# Patient Record
Sex: MALE | Race: ASIAN | NOT HISPANIC OR LATINO
[De-identification: names, ages, dates, MRNs, and addresses within clinical notes are randomized per-mention and may not be internally consistent; named-entity substitution may affect disease eponyms.]

---

## 2018-11-09 ENCOUNTER — APPOINTMENT (OUTPATIENT)
Dept: ULTRASOUND IMAGING | Facility: CLINIC | Age: 48
End: 2018-11-09
Payer: MEDICAID

## 2018-11-09 ENCOUNTER — OUTPATIENT (OUTPATIENT)
Dept: OUTPATIENT SERVICES | Facility: HOSPITAL | Age: 48
LOS: 1 days | End: 2018-11-09
Payer: COMMERCIAL

## 2018-11-09 DIAGNOSIS — Z00.8 ENCOUNTER FOR OTHER GENERAL EXAMINATION: ICD-10-CM

## 2018-11-09 PROCEDURE — 76700 US EXAM ABDOM COMPLETE: CPT | Mod: 26

## 2018-11-09 PROCEDURE — 76700 US EXAM ABDOM COMPLETE: CPT

## 2019-10-04 ENCOUNTER — OUTPATIENT (OUTPATIENT)
Dept: OUTPATIENT SERVICES | Facility: HOSPITAL | Age: 49
LOS: 1 days | End: 2019-10-04
Payer: COMMERCIAL

## 2019-10-04 ENCOUNTER — APPOINTMENT (OUTPATIENT)
Dept: ULTRASOUND IMAGING | Facility: HOSPITAL | Age: 49
End: 2019-10-04
Payer: COMMERCIAL

## 2019-10-04 DIAGNOSIS — R10.11 RIGHT UPPER QUADRANT PAIN: ICD-10-CM

## 2019-10-04 DIAGNOSIS — K82.4 CHOLESTEROLOSIS OF GALLBLADDER: ICD-10-CM

## 2019-10-04 PROCEDURE — 76705 ECHO EXAM OF ABDOMEN: CPT

## 2019-10-04 PROCEDURE — 76705 ECHO EXAM OF ABDOMEN: CPT | Mod: 26

## 2021-10-27 ENCOUNTER — APPOINTMENT (OUTPATIENT)
Dept: UROLOGY | Facility: CLINIC | Age: 51
End: 2021-10-27
Payer: COMMERCIAL

## 2021-10-27 VITALS — HEART RATE: 66 BPM | DIASTOLIC BLOOD PRESSURE: 70 MMHG | TEMPERATURE: 97.8 F | SYSTOLIC BLOOD PRESSURE: 110 MMHG

## 2021-10-27 PROCEDURE — 99204 OFFICE O/P NEW MOD 45 MIN: CPT

## 2021-10-27 PROCEDURE — 51798 US URINE CAPACITY MEASURE: CPT

## 2021-11-15 ENCOUNTER — OUTPATIENT (OUTPATIENT)
Dept: OUTPATIENT SERVICES | Facility: HOSPITAL | Age: 51
LOS: 1 days | End: 2021-11-15
Payer: COMMERCIAL

## 2021-11-15 ENCOUNTER — APPOINTMENT (OUTPATIENT)
Dept: ULTRASOUND IMAGING | Facility: CLINIC | Age: 51
End: 2021-11-15

## 2021-11-15 DIAGNOSIS — Z00.8 ENCOUNTER FOR OTHER GENERAL EXAMINATION: ICD-10-CM

## 2021-11-15 PROCEDURE — 76700 US EXAM ABDOM COMPLETE: CPT | Mod: 26

## 2021-11-15 PROCEDURE — 76700 US EXAM ABDOM COMPLETE: CPT

## 2021-12-29 LAB
APPEARANCE: CLEAR
BACTERIA UR CULT: NORMAL
BACTERIA: NEGATIVE
BILIRUBIN URINE: NEGATIVE
BLOOD URINE: NEGATIVE
COLOR: NORMAL
GLUCOSE QUALITATIVE U: NEGATIVE
HYALINE CASTS: 0 /LPF
KETONES URINE: NEGATIVE
LEUKOCYTE ESTERASE URINE: NEGATIVE
MICROSCOPIC-UA: NORMAL
NITRITE URINE: NEGATIVE
PH URINE: 6
PROTEIN URINE: NEGATIVE
RED BLOOD CELLS URINE: 0 /HPF
SPECIFIC GRAVITY URINE: 1.03
SQUAMOUS EPITHELIAL CELLS: 0 /HPF
UROBILINOGEN URINE: NORMAL
WHITE BLOOD CELLS URINE: 0 /HPF

## 2022-05-06 ENCOUNTER — NON-APPOINTMENT (OUTPATIENT)
Age: 52
End: 2022-05-06

## 2022-05-06 ENCOUNTER — APPOINTMENT (OUTPATIENT)
Dept: FAMILY MEDICINE | Facility: CLINIC | Age: 52
End: 2022-05-06
Payer: COMMERCIAL

## 2022-05-06 VITALS
TEMPERATURE: 98 F | RESPIRATION RATE: 12 BRPM | OXYGEN SATURATION: 98 % | DIASTOLIC BLOOD PRESSURE: 70 MMHG | HEART RATE: 70 BPM | SYSTOLIC BLOOD PRESSURE: 108 MMHG

## 2022-05-06 DIAGNOSIS — R19.8 OTHER SPECIFIED SYMPTOMS AND SIGNS INVOLVING THE DIGESTIVE SYSTEM AND ABDOMEN: ICD-10-CM

## 2022-05-06 PROCEDURE — 99204 OFFICE O/P NEW MOD 45 MIN: CPT

## 2022-05-06 RX ORDER — ZOSTER VACCINE RECOMBINANT, ADJUVANTED 50 MCG/0.5
50 KIT INTRAMUSCULAR
Qty: 1 | Refills: 1 | Status: ACTIVE | COMMUNITY
Start: 2022-05-06 | End: 1900-01-01

## 2022-05-06 NOTE — HISTORY OF PRESENT ILLNESS
[de-identified] : Presents to establish self to practice. Reports he is healthy but suffers from urinary frequency chronically. Has seen in urologist in October who started patient on Flomax with mild relief of symptoms. He reports he believes he is not retaining urine and it generally worse after he drinks coffee. \par Reports last lipid panel was indicative of mild elevation in total cholesterol. Overall he has cleaned up his diet.\par \par Family hx of colon cancer. Colonoscopy about 4 years ago which was unremarkable per patient

## 2022-05-06 NOTE — HEALTH RISK ASSESSMENT
[Very Good] : ~his/her~  mood as very good [Never] : Never [Yes] : Yes [Monthly or less (1 pt)] : Monthly or less (1 point) [1 or 2 (0 pts)] : 1 or 2 (0 points) [Never (0 pts)] : Never (0 points) [No] : In the past 12 months have you used drugs other than those required for medical reasons? No [0] : 2) Feeling down, depressed, or hopeless: Not at all (0) [PHQ-2 Negative - No further assessment needed] : PHQ-2 Negative - No further assessment needed [Patient reported colonoscopy was normal] : Patient reported colonoscopy was normal [Alone] : lives alone [With Family] : lives with family [Employed] : employed [College] : College [] :  [# Of Children ___] : has [unfilled] children [Sexually Active] : sexually active [Fully functional (using the telephone, shopping, preparing meals, housekeeping, doing laundry, using] : Fully functional and needs no help or supervision to perform IADLs (using the telephone, shopping, preparing meals, housekeeping, doing laundry, using transportation, managing medications and managing finances) [FreeTextEntry1] : Urinary frequency  [Change in mental status noted] : No change in mental status noted [Reports changes in hearing] : Reports no changes in hearing [Reports changes in vision] : Reports no changes in vision [Reports changes in dental health] : Reports no changes in dental health [ColonoscopyDate] : 2018

## 2022-05-06 NOTE — ASSESSMENT
[FreeTextEntry1] : Encouraged eval with GI and urology \par UA/UC\par Decrease caffeine, try to double void, last drink before 6 -7 pm \par COmprehensive blood work sent \par ENcouraged fu for physical \par Shingles vaccine sent

## 2022-05-16 LAB
ALBUMIN SERPL ELPH-MCNC: 4.7 G/DL
ALP BLD-CCNC: 72 U/L
ALT SERPL-CCNC: 40 U/L
ANION GAP SERPL CALC-SCNC: 12 MMOL/L
APPEARANCE: CLEAR
AST SERPL-CCNC: 29 U/L
BACTERIA: NEGATIVE
BASOPHILS # BLD AUTO: 0.04 K/UL
BASOPHILS NFR BLD AUTO: 0.6 %
BILIRUB SERPL-MCNC: 0.4 MG/DL
BILIRUBIN URINE: NEGATIVE
BLOOD URINE: NEGATIVE
BUN SERPL-MCNC: 14 MG/DL
CALCIUM SERPL-MCNC: 9.3 MG/DL
CHLORIDE SERPL-SCNC: 104 MMOL/L
CHOLEST SERPL-MCNC: 241 MG/DL
CO2 SERPL-SCNC: 25 MMOL/L
COLOR: NORMAL
CREAT SERPL-MCNC: 0.66 MG/DL
EGFR: 114 ML/MIN/1.73M2
EOSINOPHIL # BLD AUTO: 0.17 K/UL
EOSINOPHIL NFR BLD AUTO: 2.7 %
ESTIMATED AVERAGE GLUCOSE: 120 MG/DL
GLUCOSE QUALITATIVE U: NEGATIVE
GLUCOSE SERPL-MCNC: 106 MG/DL
HBA1C MFR BLD HPLC: 5.8 %
HCT VFR BLD CALC: 42.9 %
HDLC SERPL-MCNC: 74 MG/DL
HGB BLD-MCNC: 14.2 G/DL
HYALINE CASTS: 0 /LPF
IMM GRANULOCYTES NFR BLD AUTO: 0.5 %
KETONES URINE: NEGATIVE
LDLC SERPL CALC-MCNC: 155 MG/DL
LEUKOCYTE ESTERASE URINE: NEGATIVE
LYMPHOCYTES # BLD AUTO: 2.32 K/UL
LYMPHOCYTES NFR BLD AUTO: 36.7 %
MAN DIFF?: NORMAL
MCHC RBC-ENTMCNC: 31.8 PG
MCHC RBC-ENTMCNC: 33.1 GM/DL
MCV RBC AUTO: 96 FL
MICROSCOPIC-UA: NORMAL
MONOCYTES # BLD AUTO: 0.46 K/UL
MONOCYTES NFR BLD AUTO: 7.3 %
NEUTROPHILS # BLD AUTO: 3.31 K/UL
NEUTROPHILS NFR BLD AUTO: 52.2 %
NITRITE URINE: NEGATIVE
NONHDLC SERPL-MCNC: 167 MG/DL
OSMOLALITY SERPL: 298 MOSMOL/KG
OSMOLALITY UR: 807 MOSM/KG
PH URINE: 6.5
PLATELET # BLD AUTO: 254 K/UL
POTASSIUM SERPL-SCNC: 4.7 MMOL/L
PROT SERPL-MCNC: 7.4 G/DL
PROTEIN URINE: NEGATIVE
PSA FREE FLD-MCNC: 34 %
PSA FREE SERPL-MCNC: 0.23 NG/ML
PSA SERPL-MCNC: 0.68 NG/ML
RBC # BLD: 4.47 M/UL
RBC # FLD: 12.3 %
RED BLOOD CELLS URINE: 3 /HPF
SODIUM SERPL-SCNC: 141 MMOL/L
SPECIFIC GRAVITY URINE: 1.02
SQUAMOUS EPITHELIAL CELLS: 0 /HPF
TRIGL SERPL-MCNC: 61 MG/DL
TSH SERPL-ACNC: 1.7 UIU/ML
UROBILINOGEN URINE: NORMAL
WBC # FLD AUTO: 6.33 K/UL
WHITE BLOOD CELLS URINE: 1 /HPF

## 2022-06-28 ENCOUNTER — APPOINTMENT (OUTPATIENT)
Dept: UROLOGY | Facility: CLINIC | Age: 52
End: 2022-06-28

## 2022-06-28 DIAGNOSIS — Z80.0 FAMILY HISTORY OF MALIGNANT NEOPLASM OF DIGESTIVE ORGANS: ICD-10-CM

## 2022-06-28 DIAGNOSIS — Z78.9 OTHER SPECIFIED HEALTH STATUS: ICD-10-CM

## 2022-06-28 DIAGNOSIS — Z80.1 FAMILY HISTORY OF MALIGNANT NEOPLASM OF TRACHEA, BRONCHUS AND LUNG: ICD-10-CM

## 2022-06-28 PROCEDURE — 99213 OFFICE O/P EST LOW 20 MIN: CPT

## 2022-06-28 NOTE — HISTORY OF PRESENT ILLNESS
[FreeTextEntry1] : : Sep 19 1970 \par Referring Provider: FAUSTINO SIMON,MEGA RANDALL \par \par HPI: Mr. ASHLY SONG is a 51 year yo M with a PMHx notable for urinary frequency. He states that he has urinary frequency also when he is running. He previously saw Dr. Cruz in Oct 2021. He previously was drinking a lot of coffee, he stopped about a week ago and he noticed some decrease in the frequency of urination. He normally can hold his urine for 1-2 hours..  He had a PSA done in May which was very low at 0.68.  His stream is not bad, but he goes a lot during the day and night.  Residual urine is low ~30 cc. He did try tamsulosin which has helped with stream and not much improvement with the frequency. Nocturia 1-2x a night. \par \par Anticoagulation: None\par All: NKDA\par Social: nonsmoker, social EtOH,  with children, very active 7-8 miles a day runner\par PMHx: borderline HLD\par FHx: father with colon cancer and mother with lung cancer\par PSHx: lipoma removal \par Labs: PSA 0.68 ng/mL [Urinary Incontinence] : no urinary incontinence [Urinary Retention] : no urinary retention [Urinary Urgency] : urinary urgency [Urinary Frequency] : urinary frequency

## 2022-07-20 ENCOUNTER — RX CHANGE (OUTPATIENT)
Age: 52
End: 2022-07-20

## 2022-07-28 ENCOUNTER — APPOINTMENT (OUTPATIENT)
Dept: UROLOGY | Facility: CLINIC | Age: 52
End: 2022-07-28

## 2022-07-28 VITALS
DIASTOLIC BLOOD PRESSURE: 72 MMHG | RESPIRATION RATE: 18 BRPM | SYSTOLIC BLOOD PRESSURE: 116 MMHG | HEIGHT: 71 IN | HEART RATE: 74 BPM | BODY MASS INDEX: 23.8 KG/M2 | WEIGHT: 170 LBS | TEMPERATURE: 98.2 F

## 2022-07-28 PROCEDURE — 99212 OFFICE O/P EST SF 10 MIN: CPT

## 2022-07-28 NOTE — HISTORY OF PRESENT ILLNESS
[FreeTextEntry1] : : Sep 19 1970 \par Referring Provider: FAUSTINO SIMON,MEGA RANDALL \par \par HPI: Mr. ASHLY SONG is a 51 year yo M with a PMHx notable for urinary frequency. He states that he has urinary frequency also when he is running. He previously saw Dr. Cruz in Oct 2021. He previously was drinking a lot of coffee, he stopped about a week ago and he noticed some decrease in the frequency of urination. He normally can hold his urine for 1-2 hours..  He had a PSA done in May which was very low at 0.68.  His stream is not bad, but he goes a lot during the day and night.  Residual urine is low ~30 cc. He did try tamsulosin which has helped with stream and not much improvement with the frequency. Nocturia 1-2x a night. \par \par Anticoagulation: None\par All: NKDA\par Social: nonsmoker, social EtOH,  with children, very active 7-8 miles a day runner\par PMHx: borderline HLD\par FHx: father with colon cancer and mother with lung cancer\par PSHx: lipoma removal \par Labs: PSA 0.68 ng/mL\par \par :\par Here today for follow up for urinary frequency. He feels that this has improved his symptoms about 50%, mild constipation but no dry mouth. He's currently on oxybutynin 5 mg XL. His urinary frequency is down to 2-3 hours feels improved. Wants to try vesicare and interested in trying pelvic floor PT. [Urinary Incontinence] : no urinary incontinence [Urinary Retention] : no urinary retention [Urinary Urgency] : urinary urgency [Urinary Frequency] : urinary frequency

## 2022-09-08 ENCOUNTER — APPOINTMENT (OUTPATIENT)
Dept: UROLOGY | Facility: CLINIC | Age: 52
End: 2022-09-08

## 2023-01-05 ENCOUNTER — APPOINTMENT (OUTPATIENT)
Dept: UROLOGY | Facility: CLINIC | Age: 53
End: 2023-01-05
Payer: COMMERCIAL

## 2023-01-05 VITALS — RESPIRATION RATE: 17 BRPM | SYSTOLIC BLOOD PRESSURE: 115 MMHG | DIASTOLIC BLOOD PRESSURE: 75 MMHG | HEART RATE: 77 BPM

## 2023-01-05 PROCEDURE — 99213 OFFICE O/P EST LOW 20 MIN: CPT

## 2023-01-05 NOTE — ASSESSMENT
[FreeTextEntry1] : 51 yo with urinary frequency, improved on solifenacin except for when working out

## 2023-01-05 NOTE — HISTORY OF PRESENT ILLNESS
[FreeTextEntry1] : : Sep 19 1970 \par Referring Provider: FAUSTINO SIMON,MEGA RANDALL \par \par HPI: Mr. ASHLY SONG is a 51 year yo M with a PMHx notable for urinary frequency. He states that he has urinary frequency also when he is running. He previously saw Dr. Cruz in Oct 2021. He previously was drinking a lot of coffee, he stopped about a week ago and he noticed some decrease in the frequency of urination. He normally can hold his urine for 1-2 hours..  He had a PSA done in May which was very low at 0.68.  His stream is not bad, but he goes a lot during the day and night.  Residual urine is low ~30 cc. He did try tamsulosin which has helped with stream and not much improvement with the frequency. Nocturia 1-2x a night. \par \par Anticoagulation: None\par All: NKDA\par Social: nonsmoker, social EtOH,  with children, very active 7-8 miles a day runner\par PMHx: borderline HLD\par FHx: father with colon cancer and mother with lung cancer\par PSHx: lipoma removal \par Labs: PSA 0.68 ng/mL\par \par :\par Here today for follow up for urinary frequency. He feels that this has improved his symptoms about 50%, mild constipation but no dry mouth. He's currently on oxybutynin 5 mg XL. His urinary frequency is down to 2-3 hours feels improved. Wants to try vesicare and interested in trying pelvic floor PT.\par \par :\par Here today for follow up for urinary frequency. Has been on the vesicare 10 mg PO with improvement of his symptoms, no constipation; mild dry mouth occasionally. Urinary frequency every 3-4 hours. Mostly complaining of urinary frequency when he is running. Has not had pelvic floor PT. Possible related to pelvic floor contraction when running resulting in overactivity- 3x during a 1 hour run. [Urinary Incontinence] : no urinary incontinence [Urinary Retention] : no urinary retention [Urinary Urgency] : urinary urgency [Urinary Frequency] : urinary frequency

## 2023-04-20 ENCOUNTER — APPOINTMENT (OUTPATIENT)
Dept: UROLOGY | Facility: CLINIC | Age: 53
End: 2023-04-20
Payer: COMMERCIAL

## 2023-04-20 PROCEDURE — 99213 OFFICE O/P EST LOW 20 MIN: CPT

## 2023-04-22 LAB — PSA SERPL-MCNC: 0.66 NG/ML

## 2023-04-22 NOTE — PHYSICAL EXAM
[General Appearance - Well Developed] : well developed [General Appearance - Well Nourished] : well nourished [Bowel Sounds] : normal bowel sounds [Abdomen Soft] : soft [Skin Color & Pigmentation] : normal skin color and pigmentation [Heart Rate And Rhythm] : Heart rate and rhythm were normal [Skin Turgor] : supple [] : no respiratory distress [Oriented To Time, Place, And Person] : oriented to person, place, and time [Respiration, Rhythm And Depth] : normal respiratory rhythm and effort [Not Anxious] : not anxious [Normal Station and Gait] : the gait and station were normal for the patient's age [No Focal Deficits] : no focal deficits

## 2023-04-22 NOTE — HISTORY OF PRESENT ILLNESS
[FreeTextEntry1] : : Sep 19 1970 \par Referring Provider: FAUSTINO SIMON,MEGA RANDALL \par \par HPI: Mr. ASHLY SONG is a 51 year yo M with a PMHx notable for urinary frequency. He states that he has urinary frequency also when he is running. He previously saw Dr. Cruz in Oct 2021. He previously was drinking a lot of coffee, he stopped about a week ago and he noticed some decrease in the frequency of urination. He normally can hold his urine for 1-2 hours..  He had a PSA done in May which was very low at 0.68.  His stream is not bad, but he goes a lot during the day and night.  Residual urine is low ~30 cc. He did try tamsulosin which has helped with stream and not much improvement with the frequency. Nocturia 1-2x a night. \par \par Anticoagulation: None\par All: NKDA\par Social: nonsmoker, social EtOH,  with children, very active 7-8 miles a day runner\par PMHx: borderline HLD\par FHx: father with colon cancer and mother with lung cancer\par PSHx: lipoma removal \par Labs: PSA 0.68 ng/mL\par \par :\par Here today for follow up for urinary frequency. He feels that this has improved his symptoms about 50%, mild constipation but no dry mouth. He's currently on oxybutynin 5 mg XL. His urinary frequency is down to 2-3 hours feels improved. Wants to try vesicare and interested in trying pelvic floor PT.\par \par :\par Here today for follow up for urinary frequency. Has been on the vesicare 10 mg PO with improvement of his symptoms, no constipation; mild dry mouth occasionally. Urinary frequency every 3-4 hours. Mostly complaining of urinary frequency when he is running. Has not had pelvic floor PT. Possible related to pelvic floor contraction when running resulting in overactivity- 3x during a 1 hour run.\par \par :\par Has not scheduled PFPT, feels however that the vesicare is working for him. Discussed that he may see a more significant improvement by adding in PFPT given his bother when running. Overactivity is now down to 1x when running for which he feels things are stable. PSA 0.66 ng/mL.  [Urinary Incontinence] : no urinary incontinence [Urinary Retention] : no urinary retention [Urinary Urgency] : urinary urgency [Urinary Frequency] : urinary frequency

## 2023-05-16 ENCOUNTER — APPOINTMENT (OUTPATIENT)
Dept: UROLOGY | Facility: CLINIC | Age: 53
End: 2023-05-16
Payer: COMMERCIAL

## 2023-05-16 VITALS — DIASTOLIC BLOOD PRESSURE: 72 MMHG | SYSTOLIC BLOOD PRESSURE: 117 MMHG | HEART RATE: 80 BPM

## 2023-05-16 PROCEDURE — 99213 OFFICE O/P EST LOW 20 MIN: CPT

## 2023-05-16 NOTE — ASSESSMENT
[FreeTextEntry1] : 53 yo M with urinary frequency and history of BPH, having intermittent urethral discomfort

## 2023-05-16 NOTE — HISTORY OF PRESENT ILLNESS
[Urinary Urgency] : urinary urgency [Urinary Frequency] : urinary frequency [FreeTextEntry1] : : Sep 19 1970 \par Referring Provider: FAUSTINO SIMON,MEGA RANDALL \par \par HPI: Mr. ASHLY SONG is a 51 year yo M with a PMHx notable for urinary frequency. He states that he has urinary frequency also when he is running. He previously saw Dr. Cruz in Oct 2021. He previously was drinking a lot of coffee, he stopped about a week ago and he noticed some decrease in the frequency of urination. He normally can hold his urine for 1-2 hours..  He had a PSA done in May which was very low at 0.68.  His stream is not bad, but he goes a lot during the day and night.  Residual urine is low ~30 cc. He did try tamsulosin which has helped with stream and not much improvement with the frequency. Nocturia 1-2x a night. \par \par Anticoagulation: None\par All: NKDA\par Social: nonsmoker, social EtOH,  with children, very active 7-8 miles a day runner\par PMHx: borderline HLD\par FHx: father with colon cancer and mother with lung cancer\par PSHx: lipoma removal \par Labs: PSA 0.68 ng/mL\par \par :\par Here today for follow up for urinary frequency. He feels that this has improved his symptoms about 50%, mild constipation but no dry mouth. He's currently on oxybutynin 5 mg XL. His urinary frequency is down to 2-3 hours feels improved. Wants to try vesicare and interested in trying pelvic floor PT.\par \par :\par Here today for follow up for urinary frequency. Has been on the vesicare 10 mg PO with improvement of his symptoms, no constipation; mild dry mouth occasionally. Urinary frequency every 3-4 hours. Mostly complaining of urinary frequency when he is running. Has not had pelvic floor PT. Possible related to pelvic floor contraction when running resulting in overactivity- 3x during a 1 hour run.\par \par :\par Has not scheduled PFPT, feels however that the vesicare is working for him. Discussed that he may see a more significant improvement by adding in PFPT given his bother when running. Overactivity is now down to 1x when running for which he feels things are stable. PSA 0.66 ng/mL. \par \par :\par Here today still not having PFPT and vesicare works when he takes it. The vesicare works for him and the overactivity is minimal, vesicare and tamsulosin working for him. Has some urethral discomfort 20% of the time, and is only bothersome 2 to 3 out of 10. PVR minimal and emptying bladder well.  [Urinary Incontinence] : no urinary incontinence [Urinary Retention] : no urinary retention

## 2023-05-20 LAB — BACTERIA UR CULT: NORMAL

## 2023-10-19 ENCOUNTER — APPOINTMENT (OUTPATIENT)
Dept: UROLOGY | Facility: CLINIC | Age: 53
End: 2023-10-19

## 2023-10-19 ENCOUNTER — APPOINTMENT (OUTPATIENT)
Dept: UROLOGY | Facility: CLINIC | Age: 53
End: 2023-10-19
Payer: COMMERCIAL

## 2023-10-19 PROCEDURE — 99213 OFFICE O/P EST LOW 20 MIN: CPT

## 2023-11-21 ENCOUNTER — APPOINTMENT (OUTPATIENT)
Dept: UROLOGY | Facility: CLINIC | Age: 53
End: 2023-11-21

## 2023-12-07 ENCOUNTER — APPOINTMENT (OUTPATIENT)
Dept: FAMILY MEDICINE | Facility: CLINIC | Age: 53
End: 2023-12-07
Payer: COMMERCIAL

## 2023-12-07 ENCOUNTER — NON-APPOINTMENT (OUTPATIENT)
Age: 53
End: 2023-12-07

## 2023-12-07 VITALS
HEART RATE: 78 BPM | BODY MASS INDEX: 22.68 KG/M2 | RESPIRATION RATE: 16 BRPM | WEIGHT: 162 LBS | DIASTOLIC BLOOD PRESSURE: 78 MMHG | OXYGEN SATURATION: 98 % | HEIGHT: 71 IN | TEMPERATURE: 97.4 F | SYSTOLIC BLOOD PRESSURE: 120 MMHG

## 2023-12-07 DIAGNOSIS — E78.5 HYPERLIPIDEMIA, UNSPECIFIED: ICD-10-CM

## 2023-12-07 DIAGNOSIS — Z00.00 ENCOUNTER FOR GENERAL ADULT MEDICAL EXAMINATION W/OUT ABNORMAL FINDINGS: ICD-10-CM

## 2023-12-07 PROCEDURE — 93000 ELECTROCARDIOGRAM COMPLETE: CPT

## 2023-12-07 PROCEDURE — 99396 PREV VISIT EST AGE 40-64: CPT | Mod: 25

## 2023-12-07 RX ORDER — OXYBUTYNIN CHLORIDE 5 MG/1
5 TABLET, EXTENDED RELEASE ORAL
Qty: 90 | Refills: 3 | Status: DISCONTINUED | COMMUNITY
Start: 2022-06-28 | End: 2023-12-07

## 2024-06-11 ENCOUNTER — APPOINTMENT (OUTPATIENT)
Dept: UROLOGY | Facility: CLINIC | Age: 54
End: 2024-06-11
Payer: COMMERCIAL

## 2024-06-11 VITALS
OXYGEN SATURATION: 96 % | RESPIRATION RATE: 16 BRPM | DIASTOLIC BLOOD PRESSURE: 76 MMHG | HEART RATE: 62 BPM | SYSTOLIC BLOOD PRESSURE: 118 MMHG | WEIGHT: 162 LBS | HEIGHT: 71 IN | BODY MASS INDEX: 22.68 KG/M2

## 2024-06-11 DIAGNOSIS — R35.0 FREQUENCY OF MICTURITION: ICD-10-CM

## 2024-06-11 DIAGNOSIS — Z87.438 PERSONAL HISTORY OF OTHER DISEASES OF MALE GENITAL ORGANS: ICD-10-CM

## 2024-06-11 PROCEDURE — 99213 OFFICE O/P EST LOW 20 MIN: CPT

## 2024-06-11 RX ORDER — TAMSULOSIN HYDROCHLORIDE 0.4 MG/1
0.4 CAPSULE ORAL
Qty: 90 | Refills: 3 | Status: ACTIVE | COMMUNITY
Start: 2021-10-27 | End: 1900-01-01

## 2024-06-11 RX ORDER — SOLIFENACIN SUCCINATE 10 MG/1
10 TABLET ORAL
Qty: 90 | Refills: 3 | Status: ACTIVE | COMMUNITY
Start: 2022-07-28 | End: 1900-01-01

## 2024-06-11 NOTE — HISTORY OF PRESENT ILLNESS
[Nocturia] : nocturia [FreeTextEntry1] : : Sep 19 1970  Referring Provider: FAUSTINO SIMON,MEGA RANDALL   HPI: Mr. ASHLY SONG is a 51 year yo M with a PMHx notable for urinary frequency. He states that he has urinary frequency also when he is running. He previously saw Dr. Cruz in Oct 2021. He previously was drinking a lot of coffee, he stopped about a week ago and he noticed some decrease in the frequency of urination. He normally can hold his urine for 1-2 hours..  He had a PSA done in May which was very low at 0.68.  His stream is not bad, but he goes a lot during the day and night.  Residual urine is low ~30 cc. He did try tamsulosin which has helped with stream and not much improvement with the frequency. Nocturia 1-2x a night.   Anticoagulation: None All: NKDA Social: nonsmoker, social EtOH,  with children, very active 7-8 miles a day runner PMHx: borderline HLD FHx: father with colon cancer and mother with lung cancer PSHx: lipoma removal  Labs: PSA 0.68 ng/mL  : Here today for follow up for urinary frequency. He feels that this has improved his symptoms about 50%, mild constipation but no dry mouth. He's currently on oxybutynin 5 mg XL. His urinary frequency is down to 2-3 hours feels improved. Wants to try vesicare and interested in trying pelvic floor PT.  : Here today for follow up for urinary frequency. Has been on the vesicare 10 mg PO with improvement of his symptoms, no constipation; mild dry mouth occasionally. Urinary frequency every 3-4 hours. Mostly complaining of urinary frequency when he is running. Has not had pelvic floor PT. Possible related to pelvic floor contraction when running resulting in overactivity- 3x during a 1 hour run.  : Has not scheduled PFPT, feels however that the vesicare is working for him. Discussed that he may see a more significant improvement by adding in PFPT given his bother when running. Overactivity is now down to 1x when running for which he feels things are stable. PSA 0.66 ng/mL.   : Here today still not having PFPT and vesicare works when he takes it. The vesicare works for him and the overactivity is minimal, vesicare and tamsulosin working for him. Has some urethral discomfort 20% of the time, and is only bothersome 2 to 3 out of 10. PVR minimal and emptying bladder well.   10/19: Has been taking vesicare and the tamsulosin. Urethral discomfort has resolved. PVR minimal and emptying previously. He has no issues with erections. Has been taking vesicare 10 mg and the tamsulosin 0.4 mg - occasionally has symptoms of incomplete emptying. PSA 0.66 ng/mL d/w patient, normal.   24 Pt here f/u urinary frequency - taking Tamsulosin + vesicare  with good results - notes nocturia (1x)-  no blood in urine or UTIs. Pt reports drinking a large tumbler of coffee and finishes it usually by mid-morning. Plan for PSA of 0.66 last year.  [Urinary Incontinence] : no urinary incontinence [Urinary Retention] : no urinary retention [Urinary Urgency] : no urinary urgency [Urinary Frequency] : no urinary frequency [Straining] : no straining [Weak Stream] : no weak stream [Intermittency] : no intermittency [Dysuria] : no dysuria [Hematuria - Gross] : no gross hematuria

## 2024-06-11 NOTE — PHYSICAL EXAM
[General Appearance - Well Developed] : well developed [General Appearance - Well Nourished] : well nourished [Heart Rate And Rhythm] : heart rate and rhythm were normal [] : no respiratory distress [Abdomen Soft] : soft [Normal Station and Gait] : the gait and station were normal for the patient's age [Skin Turgor] : supple [No Focal Deficits] : no focal deficits [Oriented To Time, Place, And Person] : oriented to person, place, and time

## 2024-06-11 NOTE — ASSESSMENT
[FreeTextEntry1] : 53M here for f/u for urinary frequency  #Urinary frequency - Tamsulosin + Solifenacin- will continue without any changes  #PSA Screening - We discussed the fact that PSA is a nonspecific test for cancer although it is prostate specific. We have reviewed the fact that elevations can be caused by multiple separate processes with the prostate including prostate cancer, benign prostate enlargement, prostate inflammation or infection, or combination of any of the above. We also reviewed that procedures involving catheterizations or manipulation of the prostate including colonoscopy could cause PSA to be elevated. I also have reviewed with the patient that there is age specificity related to PSA and that over time there is some expectation that the PSA will slowly rise over time  RTO in 1 year

## 2024-06-15 LAB — PSA SERPL-MCNC: 0.85 NG/ML

## 2024-06-26 ENCOUNTER — NON-APPOINTMENT (OUTPATIENT)
Age: 54
End: 2024-06-26

## 2025-06-17 ENCOUNTER — APPOINTMENT (OUTPATIENT)
Dept: UROLOGY | Facility: CLINIC | Age: 55
End: 2025-06-17

## 2025-08-12 ENCOUNTER — NON-APPOINTMENT (OUTPATIENT)
Age: 55
End: 2025-08-12

## 2025-08-12 ENCOUNTER — APPOINTMENT (OUTPATIENT)
Dept: UROLOGY | Facility: CLINIC | Age: 55
End: 2025-08-12
Payer: COMMERCIAL

## 2025-08-12 DIAGNOSIS — R35.0 FREQUENCY OF MICTURITION: ICD-10-CM

## 2025-08-12 DIAGNOSIS — Z87.438 PERSONAL HISTORY OF OTHER DISEASES OF MALE GENITAL ORGANS: ICD-10-CM

## 2025-08-12 PROCEDURE — 99213 OFFICE O/P EST LOW 20 MIN: CPT

## 2025-08-13 LAB
APPEARANCE: CLEAR
BACTERIA: NEGATIVE /HPF
BILIRUBIN URINE: NEGATIVE
BLOOD URINE: ABNORMAL
CAST: 0 /LPF
COLOR: YELLOW
EPITHELIAL CELLS: 0 /HPF
GLUCOSE QUALITATIVE U: NEGATIVE MG/DL
KETONES URINE: NEGATIVE MG/DL
LEUKOCYTE ESTERASE URINE: NEGATIVE
MICROSCOPIC-UA: NORMAL
NITRITE URINE: NEGATIVE
PH URINE: 6
PROTEIN URINE: NEGATIVE MG/DL
RED BLOOD CELLS URINE: 1 /HPF
REVIEW: NORMAL
SPECIFIC GRAVITY URINE: 1.02
UROBILINOGEN URINE: 0.2 MG/DL
WHITE BLOOD CELLS URINE: 0 /HPF

## 2025-08-14 LAB — BACTERIA UR CULT: NORMAL
